# Patient Record
Sex: FEMALE | Race: WHITE
[De-identification: names, ages, dates, MRNs, and addresses within clinical notes are randomized per-mention and may not be internally consistent; named-entity substitution may affect disease eponyms.]

---

## 2022-01-21 ENCOUNTER — HOSPITAL ENCOUNTER (EMERGENCY)
Dept: HOSPITAL 35 - ER | Age: 77
Discharge: HOME | End: 2022-01-21
Payer: COMMERCIAL

## 2022-01-21 VITALS — WEIGHT: 152.01 LBS | BODY MASS INDEX: 24.43 KG/M2 | HEIGHT: 66 IN

## 2022-01-21 VITALS — DIASTOLIC BLOOD PRESSURE: 72 MMHG | SYSTOLIC BLOOD PRESSURE: 146 MMHG

## 2022-01-21 DIAGNOSIS — R11.2: ICD-10-CM

## 2022-01-21 DIAGNOSIS — Z20.822: ICD-10-CM

## 2022-01-21 DIAGNOSIS — Z90.710: ICD-10-CM

## 2022-01-21 DIAGNOSIS — Z79.891: ICD-10-CM

## 2022-01-21 DIAGNOSIS — Z90.49: ICD-10-CM

## 2022-01-21 DIAGNOSIS — M54.2: Primary | ICD-10-CM

## 2022-01-21 DIAGNOSIS — Z79.899: ICD-10-CM

## 2022-01-21 DIAGNOSIS — Z90.89: ICD-10-CM

## 2022-01-21 LAB
ALBUMIN SERPL-MCNC: 3.9 G/DL (ref 3.4–5)
ALT SERPL-CCNC: 37 U/L (ref 14–59)
ANION GAP SERPL CALC-SCNC: 14 MMOL/L (ref 7–16)
AST SERPL-CCNC: 55 U/L (ref 15–37)
BASOPHILS NFR BLD AUTO: 1 % (ref 0–2)
BILIRUB SERPL-MCNC: 0.7 MG/DL (ref 0.2–1)
BUN SERPL-MCNC: 17 MG/DL (ref 7–18)
CALCIUM SERPL-MCNC: 9.7 MG/DL (ref 8.5–10.1)
CHLORIDE SERPL-SCNC: 101 MMOL/L (ref 98–107)
CO2 SERPL-SCNC: 19 MMOL/L (ref 21–32)
CREAT SERPL-MCNC: 0.7 MG/DL (ref 0.6–1)
EOSINOPHIL NFR BLD: 1.3 % (ref 0–3)
ERYTHROCYTE [DISTWIDTH] IN BLOOD BY AUTOMATED COUNT: 15.8 % (ref 10.5–14.5)
GLUCOSE SERPL-MCNC: 111 MG/DL (ref 74–106)
GRANULOCYTES NFR BLD MANUAL: 82.8 % (ref 36–66)
HCT VFR BLD CALC: 38.7 % (ref 37–47)
HGB BLD-MCNC: 12.7 GM/DL (ref 12–15)
LYMPHOCYTES NFR BLD AUTO: 10.8 % (ref 24–44)
MCH RBC QN AUTO: 30 PG (ref 26–34)
MCHC RBC AUTO-ENTMCNC: 32.7 G/DL (ref 28–37)
MCV RBC: 91.6 FL (ref 80–100)
MONOCYTES NFR BLD: 4.1 % (ref 1–8)
NEUTROPHILS # BLD: 6.2 THOU/UL (ref 1.4–8.2)
PLATELET # BLD: 250 THOU/UL (ref 150–400)
POTASSIUM SERPL-SCNC: 5.3 MMOL/L (ref 3.5–5.1)
PROT SERPL-MCNC: 7.5 G/DL (ref 6.4–8.2)
RBC # BLD AUTO: 4.23 MIL/UL (ref 4.2–5)
SODIUM SERPL-SCNC: 134 MMOL/L (ref 136–145)
WBC # BLD AUTO: 7.5 THOU/UL (ref 4–11)

## 2022-01-22 NOTE — EKG
[place] [address] [facility]
[place]  [address]
Phone:  [phone]                    ELECTROCARDIOGRAM REPORT      
_______________________________________________________________________________
 
Name:       BOB MOE             [facility] #:                     [facility]      [id number]     Account #:      [id number]  
Admission:  22    Attend Phys:                          
Discharge:  22    Date of Birth:  [birth year]  
                                                          Report #: [id number]
   [id number]
_______________________________________________________________________________
                         [facility] ED
                                       
Test Date:    2022               Test Time:    13:38:51
Pat Name:     BOB MOE              Department:   
Patient ID:   [id number]            Room:          
Gender:       F                        Technician:   GUILLERMINA
:          1945               Requested By: Darcie Raygoza
Order Number: [id number] MD:   Harley Sutherland
                                 Measurements
Intervals                              Axis          
Rate:         89                       P:            53
UT:           159                      QRS:          -27
QRSD:         93                       T:            48
QT:           404                                    
QTc:          492                                    
                           Interpretive Statements
Sinus rhythm
Consider left ventricular hypertrophy
Nonspecific T abnormalities, lateral leads
Baseline wander in lead(s) II,III,aVF
Compared to ECG 2011 14:15:46
T-wave abnormality now present
Electronically Signed On 2022 10:55:26 CST by Harley Sutherland
[email or web address]
 
 
 
 
 
 
 
 
 
 
 
 
 
 
 
 
 
 
 
  <ELECTRONICALLY SIGNED>
   By: Harley Sutherland MD        
  22     1055
D: 22 1338                           _____________________________________
T: 22 1338                           Harley Sutherland MD          /EPI